# Patient Record
Sex: MALE | Race: BLACK OR AFRICAN AMERICAN
[De-identification: names, ages, dates, MRNs, and addresses within clinical notes are randomized per-mention and may not be internally consistent; named-entity substitution may affect disease eponyms.]

---

## 2020-07-10 ENCOUNTER — HOSPITAL ENCOUNTER (EMERGENCY)
Dept: HOSPITAL 62 - ER | Age: 55
LOS: 1 days | Discharge: ANOTHER HOSPITAL | End: 2020-07-11
Payer: MEDICARE

## 2020-07-10 DIAGNOSIS — R41.0: ICD-10-CM

## 2020-07-10 DIAGNOSIS — R91.8: ICD-10-CM

## 2020-07-10 DIAGNOSIS — T50.906A: ICD-10-CM

## 2020-07-10 DIAGNOSIS — E11.9: ICD-10-CM

## 2020-07-10 DIAGNOSIS — N39.0: ICD-10-CM

## 2020-07-10 DIAGNOSIS — R16.0: ICD-10-CM

## 2020-07-10 DIAGNOSIS — Z91.128: ICD-10-CM

## 2020-07-10 DIAGNOSIS — R29.6: ICD-10-CM

## 2020-07-10 DIAGNOSIS — N20.1: ICD-10-CM

## 2020-07-10 DIAGNOSIS — N19: ICD-10-CM

## 2020-07-10 DIAGNOSIS — Z99.2: ICD-10-CM

## 2020-07-10 DIAGNOSIS — Z91.14: ICD-10-CM

## 2020-07-10 DIAGNOSIS — Z03.818: Primary | ICD-10-CM

## 2020-07-10 LAB
ADD MANUAL DIFF: NO
ADD MANUAL MICROSCOPIC: YES
ALBUMIN SERPL-MCNC: 4.4 G/DL (ref 3.5–5)
ALP SERPL-CCNC: 106 U/L (ref 38–126)
ANION GAP SERPL CALC-SCNC: 26 MMOL/L (ref 5–19)
APPEARANCE UR: (no result)
APTT PPP: YELLOW S
AST SERPL-CCNC: 18 U/L (ref 17–59)
BACTERIA #/AREA URNS HPF: (no result) /HPF
BASOPHILS # BLD AUTO: 0.1 10^3/UL (ref 0–0.2)
BASOPHILS NFR BLD AUTO: 0.7 % (ref 0–2)
BILIRUB DIRECT SERPL-MCNC: 0.6 MG/DL (ref 0–0.4)
BILIRUB SERPL-MCNC: 0.8 MG/DL (ref 0.2–1.3)
BILIRUB UR QL STRIP: NEGATIVE
BUN SERPL-MCNC: 87 MG/DL (ref 7–20)
CALCIUM: 10.1 MG/DL (ref 8.4–10.2)
CHLORIDE SERPL-SCNC: 90 MMOL/L (ref 98–107)
CK MB SERPL-MCNC: 2.34 NG/ML (ref ?–4.55)
CK SERPL-CCNC: 181 U/L (ref 55–170)
CO2 SERPL-SCNC: 22 MMOL/L (ref 22–30)
EOSINOPHIL # BLD AUTO: 0.4 10^3/UL (ref 0–0.6)
EOSINOPHIL NFR BLD AUTO: 3 % (ref 0–6)
ERYTHROCYTE [DISTWIDTH] IN BLOOD BY AUTOMATED COUNT: 14 % (ref 11.5–14)
GLUCOSE SERPL-MCNC: 210 MG/DL (ref 75–110)
GLUCOSE UR STRIP-MCNC: 50 MG/DL
HCT VFR BLD CALC: 40.6 % (ref 37.9–51)
HGB BLD-MCNC: 13 G/DL (ref 13.5–17)
KETONES UR STRIP-MCNC: NEGATIVE MG/DL
LYMPHOCYTES # BLD AUTO: 3 10^3/UL (ref 0.5–4.7)
LYMPHOCYTES NFR BLD AUTO: 24.6 % (ref 13–45)
MCH RBC QN AUTO: 28.6 PG (ref 27–33.4)
MCHC RBC AUTO-ENTMCNC: 32.1 G/DL (ref 32–36)
MCV RBC AUTO: 89 FL (ref 80–97)
MONOCYTES # BLD AUTO: 1 10^3/UL (ref 0.1–1.4)
MONOCYTES NFR BLD AUTO: 8.2 % (ref 3–13)
NEUTROPHILS # BLD AUTO: 7.7 10^3/UL (ref 1.7–8.2)
NEUTS SEG NFR BLD AUTO: 63.5 % (ref 42–78)
NITRITE UR QL STRIP: NEGATIVE
PH UR STRIP: 7 [PH] (ref 5–9)
PLATELET # BLD: 361 10^3/UL (ref 150–450)
POTASSIUM SERPL-SCNC: 4.2 MMOL/L (ref 3.6–5)
PROT SERPL-MCNC: 8.5 G/DL (ref 6.3–8.2)
PROT UR STRIP-MCNC: >=500 MG/DL
RBC # BLD AUTO: 4.55 10^6/UL (ref 4.35–5.55)
RBC #/AREA URNS HPF: (no result) /HPF
SP GR UR STRIP: 1.02
TOTAL CELLS COUNTED % (AUTO): 100 %
TROPONIN I SERPL-MCNC: 0.03 NG/ML
UROBILINOGEN UR-MCNC: NEGATIVE MG/DL (ref ?–2)
WBC # BLD AUTO: 12.1 10^3/UL (ref 4–10.5)
WBC #/AREA URNS HPF: (no result) /HPF

## 2020-07-10 PROCEDURE — 36415 COLL VENOUS BLD VENIPUNCTURE: CPT

## 2020-07-10 PROCEDURE — 82803 BLOOD GASES ANY COMBINATION: CPT

## 2020-07-10 PROCEDURE — 82010 KETONE BODYS QUAN: CPT

## 2020-07-10 PROCEDURE — 84484 ASSAY OF TROPONIN QUANT: CPT

## 2020-07-10 PROCEDURE — 93010 ELECTROCARDIOGRAM REPORT: CPT

## 2020-07-10 PROCEDURE — 70450 CT HEAD/BRAIN W/O DYE: CPT

## 2020-07-10 PROCEDURE — 80307 DRUG TEST PRSMV CHEM ANLYZR: CPT

## 2020-07-10 PROCEDURE — 82140 ASSAY OF AMMONIA: CPT

## 2020-07-10 PROCEDURE — 80053 COMPREHEN METABOLIC PANEL: CPT

## 2020-07-10 PROCEDURE — 96361 HYDRATE IV INFUSION ADD-ON: CPT

## 2020-07-10 PROCEDURE — 71046 X-RAY EXAM CHEST 2 VIEWS: CPT

## 2020-07-10 PROCEDURE — 96366 THER/PROPH/DIAG IV INF ADDON: CPT

## 2020-07-10 PROCEDURE — 82550 ASSAY OF CK (CPK): CPT

## 2020-07-10 PROCEDURE — 74176 CT ABD & PELVIS W/O CONTRAST: CPT

## 2020-07-10 PROCEDURE — 87635 SARS-COV-2 COVID-19 AMP PRB: CPT

## 2020-07-10 PROCEDURE — 85025 COMPLETE CBC W/AUTO DIFF WBC: CPT

## 2020-07-10 PROCEDURE — 87040 BLOOD CULTURE FOR BACTERIA: CPT

## 2020-07-10 PROCEDURE — 99291 CRITICAL CARE FIRST HOUR: CPT

## 2020-07-10 PROCEDURE — 93005 ELECTROCARDIOGRAM TRACING: CPT

## 2020-07-10 PROCEDURE — 96365 THER/PROPH/DIAG IV INF INIT: CPT

## 2020-07-10 PROCEDURE — 82553 CREATINE MB FRACTION: CPT

## 2020-07-10 PROCEDURE — 81001 URINALYSIS AUTO W/SCOPE: CPT

## 2020-07-10 PROCEDURE — C9803 HOPD COVID-19 SPEC COLLECT: HCPCS

## 2020-07-10 PROCEDURE — 83605 ASSAY OF LACTIC ACID: CPT

## 2020-07-10 PROCEDURE — 96368 THER/DIAG CONCURRENT INF: CPT

## 2020-07-10 PROCEDURE — 74019 RADEX ABDOMEN 2 VIEWS: CPT

## 2020-07-10 PROCEDURE — 96367 TX/PROPH/DG ADDL SEQ IV INF: CPT

## 2020-07-10 PROCEDURE — 83036 HEMOGLOBIN GLYCOSYLATED A1C: CPT

## 2020-07-10 PROCEDURE — 83690 ASSAY OF LIPASE: CPT

## 2020-07-10 NOTE — ER DOCUMENT REPORT
ED Medical Screen (RME)





- General


Chief Complaint: General Weakness


Stated Complaint: GENERAL WEAKNESS


Time Seen by Provider: 07/10/20 15:09


Mode of Arrival: Medic


Information source: Patient


Notes: 





HPI; 55-year-old male presents to the emergency room via EMS complaining of 

generalized weakness for the past 4 days.  States he is having frequent falls at

home.  Denies any injuries from the falls.  Patient is a dialysis patient on 

peritoneal dialysis.





PE:


Alert and oriented x3.  Mild distress noted.  Lungs: Clear to auscultation 

without rales, rhonchi, wheezes.  Heart: Regular rate rhythm without murmurs, 

rubs, gallops.





I have greeted and performed a rapid initial assessment of this patient.  A 

comprehensive ED assessment and evaluation of the patient, analysis of test 

results and completion of the medical decision making process will be conducted 

by additional ED providers.  I have specifically instructed the patient or 

family members with the patient to immediately return to any nursing staff 

should anything change in the patient's condition or with their chief complaint.


TRAVEL OUTSIDE OF THE [place] IN LAST 30 DAYS: No





- Related Data


Allergies/Adverse Reactions: 


                                        





No Known Allergies Allergy (Verified 07/10/20 15:05)


   











Past Medical History





- Past Medical History


Cardiac Medical History: Reports: Hx Hypercholesterolemia, Hx Hypertension


Endocrine Medical History: Reports: Hx Diabetes Mellitus Type 1, Hx Diabetes 

Mellitus Type 2





- Immunizations


Hx Diphtheria, Pertussis, Tetanus Vaccination: Yes - <5 years





Physical Exam





- Vital signs


Vitals: 





                                        











Temp Pulse Resp BP Pulse Ox


 


 99.7 F   96   16   86/44 L  77 L


 


 07/10/20 15:03  07/10/20 15:03  07/10/20 15:03  07/10/20 15:03  07/10/20 15:03














Course





- Vital Signs


Vital signs: 





                                        











Temp Pulse Resp BP Pulse Ox


 


 99.7 F   96   16   86/44 L  77 L


 


 07/10/20 15:03  07/10/20 15:03  07/10/20 15:03  07/10/20 15:03  07/10/20 15:03

## 2020-07-10 NOTE — RADIOLOGY REPORT (SQ)
EXAM DESCRIPTION:  CHEST 2 VIEWS



IMAGES COMPLETED DATE/TIME:  7/10/2020 4:46 pm



REASON FOR STUDY:  weakness



COMPARISON:  PA and lateral views of the chest from 12/9/2014.



EXAM PARAMETERS:  NUMBER OF VIEWS: Two views.

TECHNIQUE:  PA and lateral views of the chest were obtained.

RADIATION DOSE: NA

LIMITATIONS: None.



FINDINGS:  LUNGS AND PLEURA: There is a crescent of air under the right hemidiaphragm.  There is no c
onsolidation, sizeable pleural effusion or pneumothorax.

MEDIASTINUM AND HILAR STRUCTURES: No mediastinal or hilar contour abnormality.

HEART AND VASCULAR STRUCTURES: The cardiac silhouette and pulmonary vasculature are within normal fleming
its.

BONES: No acute findings.

HARDWARE: None in the chest.

OTHER: No other finding.



IMPRESSION:  [place] of air under the right hemidiaphragm.  Recommend correlation with supine and up
right views of the abdomen to exclude pneumoperitoneum.



COMMENT:   This report was called to Emergency Department at16:59 on 7/10/2020.



TECHNICAL DOCUMENTATION:  JOB ID:  [id number]

 2011 [email or web address]- All Rights Reserved



Reading location - IP/workstation name: [email or web address]

## 2020-07-10 NOTE — RADIOLOGY REPORT (SQ)
EXAM DESCRIPTION: 



CT ABDOMEN PELVIS WITHOUT IV CONTRAST



COMPLETED DATE/TME:  07/10/2020 21:45



CLINICAL HISTORY: 



55 years, Male, free air??



COMPARISON:

None.



TECHNIQUE:

Noncontrast images of the abdomen and pelvis were obtained. 

Images stored on PACS.

 

All CT scanners at this facility use dose modulation, iterative

reconstruction, and/or weight based dosing when appropriate to

reduce radiation dose to as low as reasonably achievable (ALARA).





[facility]: Dose Right [facility]: CareDose   [facility]: Dose Right    [facility]:

Teradose 4D    [facility]: Smart [employer]



LIMITATIONS:

None.



FINDINGS:



There is minimal linear atelectasis or scarring within the left

lung base.  There is mild infiltration within the infrahilar

right lower lobe centered around axial image 5, coronal image 50,

and sagittal image 41.  Incidental arterial calcifications are

noted, also involving the coronary arteries.



There is a moderate amount of free intraperitoneal air.  There is

a percutaneous catheter entering the anterior left lower quadrant

and terminating in the anterior left pelvis, presumably a

peritoneal dialysis catheter.  There is a moderate amount of free

fluid within the abdomen and pelvis.



Liver is enlarged to 22 cm in length and is of normal in CT

density.  Spleen is normal in size.



There are 2 small stones within the lower right renal pelvis/UPJ

measuring up to 4 mm, producing mild dilation of the right renal

pelvis.  Additional nonobstructing renal calculi are also noted

measuring 2 mm or less.  Further, there are coexistent renal

arterial vascular calcifications.  There is no hydronephrosis on

the left.  There are bilateral renal hypodensities measuring up

to 2.3 cm on the right and 1.5 cm on the left, likely

representing cysts giving an internal Hounsfield densities of 7

and 1, respectively.



There is no evidence of bowel obstruction.  The appendix is

within normal limits.  Additional extensive arterial vascular

calcifications are noted throughout the abdomen and pelvis as

well.  There is no abdominal aortic aneurysm.  Bone windows

reveal moderate degenerative disc disease changes at L4-L5.





IMPRESSION:







1.  Pneumoperitoneum.  There is an apparent peritoneal dialysis

catheter in place and free air could be secondary to recent

peritoneal dialysis although correlation with the patient's

history is recommended.  Bowel perforation is not completely

excluded.  Likewise, moderate free fluid within the abdomen and

pelvis could also be from peritoneal dialysis although the amount

of fluid identified is greater than what is normally seen status

post peritoneal dialysis.



2.  Nonspecific hepatomegaly.



3.  Mildly dilated right renal pelvis secondary to 2 stacked

stones in the lower pelvis/UPJ measuring up to 4 mm.



4.  mild right lower lobe infiltration.

 

TECHNICAL DOCUMENTATION:



Quality ID # 436: Final reports with documentation of one or more

dose reduction techniques (e.g., Automated exposure control,

adjustment of the mA and/or kV according to patient size, use of

iterative reconstruction technique)



copyright 2011 [employer]- All Rights Reserved

## 2020-07-10 NOTE — ER DOCUMENT REPORT
ED General





- General


Chief Complaint: Weakness


Stated Complaint: GENERAL WEAKNESS


Time Seen by Provider: 07/10/20 15:09


Mode of Arrival: Medic


Information source: Patient, Emergency Med Personnel, [facility]


Notes: 





Patient is a 55-year-old male presenting to the emergency department for weakn

ess.  When asking the patient he is markedly confused he believes he is 58 when 

he is actually 55 patient does not answer questions directly however does not 

appear to be intentionally evasive.  Patient apparently had told nursing staff 

that he is concerned about his peritoneal dialysis that he thinks that his 

numbers may be off.  Patient also reportedly has stopped taking his medications 

for diabetes because he does not believe that he is diabetic.  The remainder of 

history of present illness is unobtainable secondary to the patient's confusion 

and review of systems is somewhat questionable.


TRAVEL OUTSIDE OF THE [place] IN LAST 30 DAYS: No





- HPI


Onset: Last week


Onset/Duration: Gradual, Persistent


Quality of pain: Achy


Severity: Mild


Pain Level: 1


Associated symptoms: None


Exacerbated by: Denies


Relieved by: Denies


Similar symptoms previously: Yes


Recently seen / treated by doctor: Yes





- Related Data


Allergies/Adverse Reactions: 


                                        





No Known Allergies Allergy (Verified 07/10/20 15:05)


   











Past Medical History





- General


Information source: Patient





- Social History


Smoking Status: Never Smoker


Chew tobacco use (# tins/day): No


Frequency of alcohol use: None


Drug Abuse: None


Lives with: Family


Family History: CAD, DM


Patient has suicidal ideation: No


Patient has homicidal ideation: No





- Past Medical History


Cardiac Medical History: Reports: Hx Hypercholesterolemia, Hx Hypertension


Endocrine Medical History: Reports: Hx Diabetes Mellitus Type 1, Hx Diabetes 

Mellitus Type 2





- Immunizations


Hx Diphtheria, Pertussis, Tetanus Vaccination: Yes - <5 years


Hx Pneumococcal Vaccination: 03/03/13





Review of Systems





- Review of Systems


-: Yes ROS unobtainable due to patient's medical condition


Constitutional: Weakness


EENT: No symptoms reported


Cardiovascular: No symptoms reported


Respiratory: No symptoms reported


Gastrointestinal: See HPI


Genitourinary: No symptoms reported


Male Genitourinary: No symptoms reported


Musculoskeletal: No symptoms reported


Skin: No symptoms reported


Hematologic/Lymphatic: No symptoms reported


Neurological/Psychological: No symptoms reported





Physical Exam





- Vital signs


Vitals: 


                                        











Temp Pulse Resp BP Pulse Ox


 


 99.7 F   96   16   86/44 L  77 L


 


 07/10/20 15:03  07/10/20 15:03  07/10/20 15:03  07/10/20 15:03  07/10/20 15:03














- Notes


Notes: 





PHYSICAL EXAMINATION:


 


GENERAL: Well-appearing, well-nourished and in no acute distress.


 


HEAD: Atraumatic, normocephalic.


 


EYES: Right eye is opacified patient states he has cataracts to that eye there 

is some discharge to the eye, extraocular movements intact, sclera anicteric, 

conjunctiva are normal.


 


ENT: nares patent, oropharynx clear without exudates.  Tacky mucous membranes.


 


NECK: Normal range of motion, supple without lymphadenopathy, no appreciable JVD


 


LUNGS: Lungs clear to auscultation bilaterally and equal.  No wheezes rales or 

rhonchi.


 


HEART: Regular rate and rhythm without murmurs


 


ABDOMEN: Soft, obese nontender, normal bowel sounds.  No guarding, no rebound.  

No masses appreciated.


 


EXTREMITIES: Active full range of motion, no pitting or edema.  No cyanosis. 2+ 

pulses x4


 


NEUROLOGICAL: Patient demonstrates general confusion but otherwise does follow 

basic requests.


 


SKIN: Warm, Dry, and intact. Normal turgor, no rashes or lesions noted.





Course





- Re-evaluation


Re-evalutation: 





07/11/20 00:15


Patient has been maintained on a cardiac monitor while in emergency department. 

Patient has been reevaluated multiple times by myself and nursing staff.  

Eventually we were able to contact the patient's mother and spoke to her several

times.  She states that the patient has been acting differently over the past 2 

to 3 days he has complained of less urination over the past several days.  She 

states that the patient has been seen at [facility] in the past for 

dialysis related issues.


07/11/20 00:19


Radiologic studies demonstrate free air in the abdomen which may be secondary to

the peritoneal dialysis and excessive free fluid likewise from the peritoneal 

dialysis the liver is enlarged and there is a small right lower lobe infiltrate.

 With the patient's confusion altered mental status and UTI feel most a

ppropriate the patient should be transferred to a higher level facility.  

Patient was started on IV fluids and a gram of Rocephin.





At 2340 I contacted the transfer center at [facility] and requested 

consultation with either nephrology or the hospitalist for possibility of 

transfer due to lack of dialysis capabilities at our facility over the weekend. 

Patient's lactic acid is 1 which is normal and his anion gap is 26 which is 

obviously elevated lipase of 665 blood glucose level of 210 BUN of 87 creatinine

31.7 last time the patient was seen here his BUN was 29 creatinine was 3.6 

patient has been found to have a urinary tract infection on laboratory studies. 





07/11/20 00:21


Currently waiting on return phone call from receiving facility.


07/11/20 00:52


I was able to speak with Dr. Montes, hospitalist at receiving facility who agrees 

with transfer at this time he is asking for broader coverage with antibiotics to

include a gram of vancomycin and 3.375 g of Zosyn.  No further requests were 

made and hospital transfer center will contact us when a bed is released.  I did

speak with the patient in this regard and he at this time seems somewhat more 

alert and is agreeable with transfer due to necessity.


07/11/20 01:08


I received a call back from the transfer center and there is [facility] which 

is also accepting patients I spoke with a Dr. Lona Mcdonald who was very 

accommodating and states that they would be able to accept the patient in the 

transfer center believes bed availability would be much more expeditious to 

their facility.  Patient is excepted and currently waiting on bed assignment and

will be transferred to that facility by [facility].


07/11/20 02:00


Just spoke with transfer center they say that there are no ambulances available 

until the morning patient be maintained in the emergency department and followed

by nighttime physician until such time that the patient can have transportation 

secured.  Patient is currently stable at this time.





- Vital Signs


Vital signs: 


                                        











Temp Pulse Resp BP Pulse Ox


 


 99.7 F   96   16   130/81 H  94 


 


 07/10/20 15:03  07/10/20 15:03  07/10/20 15:03  07/10/20 21:55  07/11/20 01:00














- Laboratory


Result Diagrams: 


                                 07/10/20 16:04





                                 07/10/20 16:04


Laboratory results interpreted by me: 


                                        











  07/10/20 07/10/20 07/10/20





  16:04 16:04 18:45


 


WBC  12.1 H  


 


Hgb  13.0 L  


 


Carbonic Acid   


 


ABG pH   


 


ABG pCO2   


 


ABG pO2   


 


ABG O2 Saturation   


 


Chloride   90 L 


 


Anion Gap   26 H 


 


BUN   87 H 


 


Creatinine   31.74 H 


 


Est GFR ( Amer)   2 L 


 


Est GFR (MDRD) Non-Af   1 L 


 


Glucose   210 H 


 


Hemoglobin A1c %   


 


Direct Bilirubin   0.6 H 


 


Ammonia    < 8.7 L


 


Creatine Kinase   181 H 


 


Total Protein   8.5 H 


 


Lipase   


 


Urine Protein   


 


Urine Glucose (UA)   


 


Urine Blood   


 


Ur Leukocyte Esterase   














  07/10/20 07/10/20 07/10/20





  18:45 18:45 22:25


 


WBC   


 


Hgb   


 


Carbonic Acid   


 


ABG pH   


 


ABG pCO2   


 


ABG pO2   


 


ABG O2 Saturation   


 


Chloride   


 


Anion Gap   


 


BUN   


 


Creatinine   


 


Est GFR ( Amer)   


 


Est GFR (MDRD) Non-Af   


 


Glucose   


 


Hemoglobin A1c %   7.7 H 


 


Direct Bilirubin   


 


Ammonia   


 


Creatine Kinase   


 


Total Protein   


 


Lipase  665.1 H  


 


Urine Protein    >=500 H


 


Urine Glucose (UA)    50 H


 


Urine Blood    LARGE H


 


Ur Leukocyte Esterase    LARGE H














  07/10/20





  23:59


 


WBC 


 


Hgb 


 


Carbonic Acid  1.55 H


 


ABG pH  7.28 L


 


ABG pCO2  51.4 H


 


ABG pO2  73.1 L


 


ABG O2 Saturation  92.7 L


 


Chloride 


 


Anion Gap 


 


BUN 


 


Creatinine 


 


Est GFR ( Amer) 


 


Est GFR (MDRD) Non-Af 


 


Glucose 


 


Hemoglobin A1c % 


 


Direct Bilirubin 


 


Ammonia 


 


Creatine Kinase 


 


Total Protein 


 


Lipase 


 


Urine Protein 


 


Urine Glucose (UA) 


 


Urine Blood 


 


Ur Leukocyte Esterase 














- Diagnostic Test


Radiology reviewed: Image reviewed, Reports reviewed





- EKG Interpretation by Me


EKG shows normal: Sinus rhythm


Rate: Normal


Rhythm: NSR


When compared to previous EKG there are: No significant change





Critical Care Note





- Critical Care Note


Total time excluding time spent on procedures (mins): 60


Comments: 





Please allow  60    minutes of critical care time  spent obtaining history from 

patient or surrogate, discussions with consultants, development of treatment 

plan with patient or surrogate, evaluation of patient's response to treatment, 

examination of patient.  This also includes ordering and reviewing laboratory, 

EKG and / or  radiologic studies, performing and reassessing treatments and 

interventions as well as reviewing previous visits and old charts.





This is exclusive of separately billable procedures. 








Discharge





- Discharge


Clinical Impression: 


 Noncompliance, Uremia





Type I diabetes mellitus


Qualifiers:


 Diabetes mellitus complication status: with other specified complication 

Qualified Code(s): E10.69 - Type 1 diabetes mellitus with other specified 

complication





Hypotension


Qualifiers:


 Hypotension type: unspecified hypotension type Qualified Code(s): I95.9 - 

Hypotension, unspecified





UTI (urinary tract infection)


Qualifiers:


 Urinary tract infection type: site unspecified Hematuria presence: without 

hematuria Qualified Code(s): N39.0 - Urinary tract infection, site not specified





Dialysis complication


Qualifiers:


 Encounter type: initial encounter Qualified Code(s): T82.9XXA - Unspecified 

complication of cardiac and vascular prosthetic device, implant and graft, 

initial encounter





Condition: Fair


Disposition: [facility]

## 2020-07-10 NOTE — EKG REPORT
SEVERITY:- OTHERWISE NORMAL ECG -

SINUS RHYTHM

MINIMAL ST ELEVATION, ANTERIOR LEADS

:

Confirmed by: Valentin Tirado MD 10-Jul-2020 18:36:42

## 2020-07-10 NOTE — RADIOLOGY REPORT (SQ)
EXAM DESCRIPTION:  CT HEAD WITHOUT



IMAGES COMPLETED DATE/TIME:  7/10/2020 6:20 pm



REASON FOR STUDY:  ams



COMPARISON:  12/9/2014



TECHNIQUE:  Axial images acquired through the brain without intravenous contrast.  Images reviewed wi
th bone, brain and subdural windows.  Additional sagittal and coronal reconstructions were generated.
 Images stored on PACS.

All CT scanners at this facility use dose modulation, iterative reconstruction, and/or weight based d
osing when appropriate to reduce radiation dose to as low as reasonably achievable (ALARA).

[facility]: Dose Right  [facility]: CareDose    [facility]: Dose Right    [facility]: Teradose 4D    [facility]: Smart [employer]



RADIATION DOSE:  CT Rad equipment meets quality standard of care and radiation dose reduction techniq
ues were employed. CTDIvol: 53.2 mGy. DLP: 911 mGy-cm. mGy.



LIMITATIONS:  None.



FINDINGS:  VENTRICLES: Incidental note is made of normal variant persistent cavum septum pellucidum e
t vergae configuration.

CEREBRUM: No masses.  No hemorrhage.  No midline shift.  No evidence for acute infarction. Normal gra
y/white matter differentiation. No areas of low density in the white matter.

CEREBELLUM: No masses.  No hemorrhage.  No alteration of density.  No evidence for acute infarction.

EXTRAAXIAL SPACES: No fluid collections.  No masses.

ORBITS AND GLOBE: No intra- or extraconal masses.  Normal contour of globe without masses.

CALVARIUM: No fracture.

PARANASAL SINUSES: No fluid or mucosal thickening.

SOFT TISSUES: No mass or hematoma.

OTHER: No other significant finding.



IMPRESSION:  NORMAL BRAIN CT WITHOUT CONTRAST.

EVIDENCE OF ACUTE STROKE: NO.



COMMENT:  Quality ID # 436: Final reports with documentation of one or more dose reduction techniques
 (e.g., Automated exposure control, adjustment of the mA and/or kV according to patient size, use of 
iterative reconstruction technique)



TECHNICAL DOCUMENTATION:  JOB ID:  [id number]

 2011 [employer]- All Rights Reserved



Reading location - IP/workstation name: [email or web address]

## 2020-07-10 NOTE — RADIOLOGY REPORT (SQ)
EXAM DESCRIPTION:  ABDOMEN 2 VIEWS



IMAGES COMPLETED DATE/TIME:  7/10/2020 5:17 pm



REASON FOR STUDY:  pain



COMPARISON:  None.



NUMBER OF VIEWS:  Two views.



TECHNIQUE:  Supine and erect/decubitus radiographic images of the abdomen acquired.



LIMITATIONS:  None.



FINDINGS:  FREE AIR: None. No abnormal gas collections.

LUNG BASES: Clear.

BOWEL GAS PATTERN: Nonobstructive pattern. No dilated loops or air fluid levels.

CALCIFICATIONS: Splenic artery calcifications are demonstrated.

SOFT TISSUES: No gross mass or suggestion of organomegaly.

HARDWARE: None in the abdomen.

BONES: No acute fracture. No worrisome bone lesions.

OTHER: No other significant finding.



IMPRESSION:  NO RADIOGRAPHIC EVIDENCE FOR ACUTE ABDOMINAL DISEASE.



TECHNICAL DOCUMENTATION:  JOB ID:  [id number]

 2011 [employer]- All Rights Reserved



Reading location - IP/workstation name: [email or web address]

## 2020-07-11 VITALS — DIASTOLIC BLOOD PRESSURE: 74 MMHG | SYSTOLIC BLOOD PRESSURE: 112 MMHG

## 2020-07-11 LAB
ARTERIAL BLOOD FIO2: (no result)
ARTERIAL BLOOD H2CO3: 1.55 MMOL/L (ref 1.05–1.35)
ARTERIAL BLOOD HCO3: 23.6 MMOL/L (ref 20–24)
ARTERIAL BLOOD PCO2: 51.4 MMHG (ref 35–45)
ARTERIAL BLOOD PH: 7.28 (ref 7.35–7.45)
ARTERIAL BLOOD PO2: 73.1 MMHG (ref 80–100)
ARTERIAL BLOOD TOTAL CO2: 25.2 MMOL/L (ref 23–27)
BASE EXCESS BLDA CALC-SCNC: -3.5 MMOL/L
SAO2 % BLDA: 92.7 % (ref 94–98)

## 2020-07-11 NOTE — ER DOCUMENT REPORT
Doctor's Note


Notes: 





07/11/20 04:29


This MD notified by nursing that transport is here to take patient to [facility].  This MD went to the room and spoke to the patient about transfer.  

Patient is awake and alert and appears to be in no acute distress.  Patient 

appears to be stable for transfer.  This MD signed the EMTALA form signed Dr. Tafoya' signature.

## 2020-07-29 ENCOUNTER — HOSPITAL ENCOUNTER (EMERGENCY)
Dept: HOSPITAL 62 - ER | Age: 55
Discharge: HOME | End: 2020-07-29
Payer: MEDICARE

## 2020-07-29 VITALS — SYSTOLIC BLOOD PRESSURE: 141 MMHG | DIASTOLIC BLOOD PRESSURE: 79 MMHG

## 2020-07-29 DIAGNOSIS — Z20.828: ICD-10-CM

## 2020-07-29 DIAGNOSIS — E78.00: ICD-10-CM

## 2020-07-29 DIAGNOSIS — I10: ICD-10-CM

## 2020-07-29 DIAGNOSIS — Z99.2: ICD-10-CM

## 2020-07-29 DIAGNOSIS — J06.9: Primary | ICD-10-CM

## 2020-07-29 DIAGNOSIS — R53.1: ICD-10-CM

## 2020-07-29 DIAGNOSIS — E11.9: ICD-10-CM

## 2020-07-29 DIAGNOSIS — R11.0: ICD-10-CM

## 2020-07-29 LAB
ADD MANUAL DIFF: NO
BASOPHILS # BLD AUTO: 0 10^3/UL (ref 0–0.2)
BASOPHILS NFR BLD AUTO: 0.4 % (ref 0–2)
EOSINOPHIL # BLD AUTO: 0.3 10^3/UL (ref 0–0.6)
EOSINOPHIL NFR BLD AUTO: 3.3 % (ref 0–6)
ERYTHROCYTE [DISTWIDTH] IN BLOOD BY AUTOMATED COUNT: 13.7 % (ref 11.5–14)
HCT VFR BLD CALC: 38.8 % (ref 37.9–51)
HGB BLD-MCNC: 12.6 G/DL (ref 13.5–17)
LYMPHOCYTES # BLD AUTO: 1.7 10^3/UL (ref 0.5–4.7)
LYMPHOCYTES NFR BLD AUTO: 20.3 % (ref 13–45)
MCH RBC QN AUTO: 28.3 PG (ref 27–33.4)
MCHC RBC AUTO-ENTMCNC: 32.4 G/DL (ref 32–36)
MCV RBC AUTO: 87 FL (ref 80–97)
MONOCYTES # BLD AUTO: 0.7 10^3/UL (ref 0.1–1.4)
MONOCYTES NFR BLD AUTO: 7.9 % (ref 3–13)
NEUTROPHILS # BLD AUTO: 5.8 10^3/UL (ref 1.7–8.2)
NEUTS SEG NFR BLD AUTO: 68.1 % (ref 42–78)
PLATELET # BLD: 293 10^3/UL (ref 150–450)
RBC # BLD AUTO: 4.44 10^6/UL (ref 4.35–5.55)
TOTAL CELLS COUNTED % (AUTO): 100 %
WBC # BLD AUTO: 8.5 10^3/UL (ref 4–10.5)

## 2020-07-29 PROCEDURE — 87635 SARS-COV-2 COVID-19 AMP PRB: CPT

## 2020-07-29 PROCEDURE — 96367 TX/PROPH/DG ADDL SEQ IV INF: CPT

## 2020-07-29 PROCEDURE — 71045 X-RAY EXAM CHEST 1 VIEW: CPT

## 2020-07-29 PROCEDURE — 93010 ELECTROCARDIOGRAM REPORT: CPT

## 2020-07-29 PROCEDURE — 85025 COMPLETE CBC W/AUTO DIFF WBC: CPT

## 2020-07-29 PROCEDURE — 96365 THER/PROPH/DIAG IV INF INIT: CPT

## 2020-07-29 PROCEDURE — 93005 ELECTROCARDIOGRAM TRACING: CPT

## 2020-07-29 PROCEDURE — 36415 COLL VENOUS BLD VENIPUNCTURE: CPT

## 2020-07-29 PROCEDURE — C9803 HOPD COVID-19 SPEC COLLECT: HCPCS

## 2020-07-29 PROCEDURE — 99284 EMERGENCY DEPT VISIT MOD MDM: CPT

## 2020-07-29 PROCEDURE — 87040 BLOOD CULTURE FOR BACTERIA: CPT

## 2020-07-29 NOTE — ER DOCUMENT REPORT
ED General





- General


Chief Complaint: General Weakness


Stated Complaint: WEAKNESS


Time Seen by Provider: 20 16:47


Mode of Arrival: Ambulatory


Information source: Patient


Notes: 





20 15:45 (created 20 15:59) - ED Nursing Note by NATACHA CUEVAS Num: [id number]  : 1965  Patient Age: 55





Pt to ED via EMS stretcher. Pt stands and moves to bed with use of cane. Pt c/o 

weakness/nausea x3 days. Pt reports a productive cough with "greyish" phlegm. Pt

in NAD. Pt was at [facility] for peritonitis approximately 1-2 weeks ago. Currently a 

peritoneal home dialysis pt. Pt states he was unable to complete full dialysis 

treatment last night due to nausea. EMS admin Zofran 4 mg ODT. EMS states 

lactate is 0.9 with a BGL of 243. Pt denies pain/SOB.  VS obtained. Will CTM.  





my notes


55-year-old black male arrives with chief complaint of having weakness for 3 

days with gray productive cough as well.  Patient reports he has had pneumonia 

many years ago.  He denies any hemoptysis but does admit to fevers and chills.  

Also patient denies any trauma.  He denies any abdominal pain back pain 

extremity pain skin lesions insect bites tick bites animal bites human bites 

coronavirus exposure but was here in the hospital around 2 weeks ago for perito

nitis.


TRAVEL OUTSIDE OF THE [place] IN LAST 30 DAYS: No





- HPI


Onset: This morning





- Related Data


Allergies/Adverse Reactions: 


                                        





No Known Allergies Allergy (Verified 20 16:05)


   








Home Medications: Carvedilol, Potassium Chloride, Lisinopril, Bumetanide, 

Renaplex, Meclizine, Levothyroxine





Past Medical History





- Social History


Smoking Status: Never Smoker


Frequency of alcohol use: None


Drug Abuse: None


Family History: CAD, DM





- Past Medical History


Cardiac Medical History: Reports: Hx Hypercholesterolemia, Hx Hypertension


Endocrine Medical History: Reports: Hx Diabetes Mellitus Type 1, Hx Diabetes 

Mellitus Type 2





- Immunizations


Hx Diphtheria, Pertussis, Tetanus Vaccination: Yes - <5 years


Hx Pneumococcal Vaccination: 13





Physical Exam





- Vital signs


Vitals: 


                                        











Temp


 


 99 F 


 


 20 15:44














Course





- Vital Signs


Vital signs: 


                                        











Temp Pulse Resp BP Pulse Ox


 


 99 F   99   20   99/55 L  100 


 


 20 15:55  20 15:55  20 15:55  20 15:55  20 15:55














- Laboratory


Result Diagrams: 


                                 20 17:20





Laboratory results interpreted by me: 


                                        











  20





  17:20


 


Hgb  12.6 L














Discharge





- Discharge


Clinical Impression: 


URI (upper respiratory infection)


Qualifiers:


 URI type: unspecified URI Qualified Code(s): J06.9 - Acute upper respiratory 

infection, unspecified





Condition: Good


Disposition: HOME, SELF-CARE


Additional Instructions: 


follow with personal doctor and return to ER as needed; dispense as directed 

encourage fluids


Prescriptions: 


Benzonatate [Tessalon Perles 100 mg Capsule] 100 mg PO BID #20 capsule


Azithromycin [Zithromax 250 mg Tablet] 250 mg PO ASDIR PRN #6 tablet


 PRN Reason:

## 2020-07-29 NOTE — RADIOLOGY REPORT (SQ)
EXAM DESCRIPTION:  CHEST SINGLE VIEW



IMAGES COMPLETED DATE/TIME:  7/29/2020 6:57 pm



REASON FOR STUDY:  cough



COMPARISON:  7/10/2020



NUMBER OF VIEWS:  One view.



TECHNIQUE:  Single frontal radiographic view of the chest acquired.



LIMITATIONS:  None.



FINDINGS:  LUNGS AND PLEURA: No opacities, masses or pneumothorax. No pleural effusion.

MEDIASTINUM AND HILAR STRUCTURES: No masses.  Contour normal.

HEART AND VASCULAR STRUCTURES: Heart normal in size.  Normal vasculature.

BONES: No acute findings.

HARDWARE: None in the chest.

OTHER: No other significant finding.



IMPRESSION:  NO SIGNIFICANT RADIOGRAPHIC FINDING IN THE CHEST.



TECHNICAL DOCUMENTATION:  JOB ID:  [id number]

 2011 [employer]- All Rights Reserved



Reading location - IP/workstation name: [email or web address]

## 2020-07-29 NOTE — EKG REPORT
SEVERITY:- OTHERWISE NORMAL ECG -

SINUS RHYTHM

MINIMAL ST ELEVATION, ANTERIOR LEADS

:

Confirmed by: Wendy Severino MD 29-Jul-2020 20:06:26